# Patient Record
Sex: FEMALE | Race: WHITE | NOT HISPANIC OR LATINO | Employment: OTHER | URBAN - METROPOLITAN AREA
[De-identification: names, ages, dates, MRNs, and addresses within clinical notes are randomized per-mention and may not be internally consistent; named-entity substitution may affect disease eponyms.]

---

## 2018-12-05 NOTE — H&P
I do not have a medical record number for her, but she will be admitted on   12/11/2018.    CHIEF COMPLAINT:  Pelvic pain.    HISTORY OF PRESENT ILLNESS:  Ms. Avitia is a 38-year-old G3, P2-0-1-2 with   pelvic pain after an ablation.  She has dyspareunia and constant pelvic pain and   vaginal bleeding after an ablation.  She is being admitted for definitive   therapy.    PAST MEDICAL HISTORY:  Pertinent for migraines.    PAST SURGICAL HISTORY:  She had spinal fusion.    OBSTETRIC HISTORY:  She had one spontaneous miscarriage and two vaginal   deliveries.    SOCIAL HISTORY:  She is retired  because of migraines.  She has no toxic   habits.    MEDICATIONS:  She is on Migravent, Metamucil, ____, Topamax, Mobic, Zanaflex and   a baby aspirin.    ALLERGIES:  SHE IS ALLERGIC TO COMPAZINE.    REVIEW OF SYSTEMS:  She has a painful uterus.  She does not have UTI symptoms.    She has no chest pain, no shortness of breath.  She does have a history of an   abnormal Pap in the past, but her last Pap smear was normal in November and HPV   negative.    PHYSICAL EXAMINATION:  VITAL SIGNS:  Her blood pressure is 124/80, her weight is 152 pounds and she is   5 feet 10 inches.  Her BMI is 22.  GENERAL:  No distress, alert and oriented.  HEART:  Regular rate and rhythm without murmur.  CHEST:  Clear to auscultation bilaterally.  ABDOMEN:  Soft and nontender.  She does have a vertical scar on her abdomen   below the umbilicus.  PELVIC:  Her uterus is normal shape and approximately 6-week size, anteverted   and moderately tender.  There are no adnexal masses.    LABORATORY DATA:  Her Pap smear is normal.  HPV is negative.  An ultrasound   shows the uterus to be 8.4 x 4.2 x 5 cm, anteverted with no endometrial lesions,   no fibroids and no adnexal cysts.    IMPRESSION:  Dysmenorrhea, dyspareunia and history of ablation.    PLAN:  Laparoscopic assisted vaginal hysterectomy, bilateral salpingectomy and   cystoscopy.  The risks,  benefits and alternatives were explained to the patient.      LGC/IN  dd: 12/04/2018 13:03:57 (CST)  td: 12/04/2018 18:06:38 (CST)  Doc ID   #[id number]  Job ID #[id number]    CC:

## 2018-12-10 ENCOUNTER — HOSPITAL ENCOUNTER (OUTPATIENT)
Dept: PREADMISSION TESTING | Facility: HOSPITAL | Age: 38
Discharge: HOME | End: 2018-12-10
Attending: SPECIALIST
Payer: OTHER GOVERNMENT

## 2018-12-10 ENCOUNTER — CLINICAL SUPPORT (OUTPATIENT)
Dept: LAB | Facility: HOSPITAL | Age: 38
End: 2018-12-10
Attending: SPECIALIST
Payer: OTHER GOVERNMENT

## 2018-12-10 ENCOUNTER — ANESTHESIA EVENT (OUTPATIENT)
Dept: SURGERY | Facility: HOSPITAL | Age: 38
End: 2018-12-10
Payer: OTHER GOVERNMENT

## 2018-12-10 VITALS
OXYGEN SATURATION: 100 % | RESPIRATION RATE: 18 BRPM | WEIGHT: 150.81 LBS | SYSTOLIC BLOOD PRESSURE: 137 MMHG | BODY MASS INDEX: 21.59 KG/M2 | HEIGHT: 70 IN | HEART RATE: 63 BPM | TEMPERATURE: 98 F | DIASTOLIC BLOOD PRESSURE: 79 MMHG

## 2018-12-10 DIAGNOSIS — Z01.818 PREOP EXAMINATION: ICD-10-CM

## 2018-12-10 PROCEDURE — 93010 ELECTROCARDIOGRAM REPORT: CPT | Mod: ,,, | Performed by: INTERNAL MEDICINE

## 2018-12-10 PROCEDURE — 93010 EKG 12-LEAD: ICD-10-PCS | Mod: ,,, | Performed by: INTERNAL MEDICINE

## 2018-12-10 PROCEDURE — 93005 ELECTROCARDIOGRAM TRACING: CPT

## 2018-12-10 RX ORDER — LAMOTRIGINE 100 MG/1
300 TABLET ORAL DAILY
COMMUNITY

## 2018-12-10 RX ORDER — TIZANIDINE 2 MG/1
2 TABLET ORAL NIGHTLY PRN
COMMUNITY
End: 2020-02-06 | Stop reason: CLARIF

## 2018-12-10 RX ORDER — LIDOCAINE HYDROCHLORIDE 10 MG/ML
1 INJECTION, SOLUTION EPIDURAL; INFILTRATION; INTRACAUDAL; PERINEURAL ONCE
Status: CANCELLED | OUTPATIENT
Start: 2018-12-10 | End: 2018-12-10

## 2018-12-10 RX ORDER — BROMPHENIRAMINE MALEATE, DEXTROMETHORPHAN HBR, PHENYLEPHRINE HCL, DIPHENHYDRAMINE HCL, PHENYLEPHRINE HCL 0.52G
0.52 KIT ORAL DAILY
COMMUNITY

## 2018-12-10 RX ORDER — MELOXICAM 7.5 MG/1
15 TABLET ORAL DAILY
COMMUNITY

## 2018-12-10 RX ORDER — TOPIRAMATE 50 MG/1
50 TABLET, FILM COATED ORAL 2 TIMES DAILY
COMMUNITY
End: 2020-02-06 | Stop reason: CLARIF

## 2018-12-10 RX ORDER — DIHYDROERGOTAMINE MESYLATE 4 MG/ML
1 SPRAY NASAL
COMMUNITY

## 2018-12-10 RX ORDER — SODIUM CHLORIDE, SODIUM LACTATE, POTASSIUM CHLORIDE, CALCIUM CHLORIDE 600; 310; 30; 20 MG/100ML; MG/100ML; MG/100ML; MG/100ML
INJECTION, SOLUTION INTRAVENOUS CONTINUOUS
Status: CANCELLED | OUTPATIENT
Start: 2018-12-10

## 2018-12-10 RX ORDER — ASPIRIN 81 MG/1
81 TABLET ORAL DAILY
COMMUNITY

## 2018-12-10 RX ORDER — CEFAZOLIN SODIUM 2 G/50ML
2 SOLUTION INTRAVENOUS
Status: CANCELLED | OUTPATIENT
Start: 2018-12-11

## 2018-12-10 NOTE — DISCHARGE INSTRUCTIONS
Your surgery is scheduled for 12/11/18.    Please report to Outpatient Surgery Intake Office on the 2nd FLOOR at 0845 a.m.          INSTRUCTIONS IMPORTANT!!!  ¨ Do not eat or drink after 12 midnight-including water. OK to brush teeth, no   gum, candy or mints!    ¨ Take only these medicines with a small swallow of water-morning of surgery.        ____  Proceed to [facility] on 12/10/18 for additional blood test.        ____  Do not wear makeup, including mascara.  ____  No powder, lotions or creams to surgical area.  ____  Please remove all jewelry, including piercings and leave at home.  ____  No money or valuables needed. Please leave at home.  ____  Please bring any documents given by your doctor.  ____  If going home the same day, arrange for a ride home. You will not be able to             drive if Anesthesia was used.  ____  Children under 18 years require a parent / guardian present the entire time             they are in surgery / recovery.  ____  Wear loose fitting clothing. Allow for dressings, bandages.  ____  Stop Aspirin, Ibuprofen, Motrin and Aleve at least 3-5 days before surgery, unless otherwise instructed by your doctor, or the nurse.   You MAY use Tylenol/acetaminophen until day of surgery.  ____  Wash the surgical area with Hibiclens the night before surgery, and again the             morning of surgery.  Be sure to rinse hibiclens off completely (if instructed by   nurse).  ____  If you take diabetic medication, do not take am of surgery unless instructed by Doctor.  ____  Call MD for temperature above 101 degrees or any other signs of infection such as Urinary (bladder) infection, Upper respiratory infection, skin boils, etc.  ____ Stop taking any Fish Oil supplement or any Vitamins that contain Vitamin E at least 5 days prior to surgery.  ____ Do Not wear your contact lenses the day of your procedure.  You may wear your glasses.      ____Do not shave for 3 days prior to  surgery.  ____ Practice Good hand washing before, during, and after procedure.      I have read or had read and explained to me, and understand the above information.  Additional comments or instructions:  For additional questions call [phone]      Pre-Op Bathing Instructions    Before surgery, you can play an important role in your own health.    Because skin is not sterile, we need to be sure that your skin is as free of germs as possible. By following the instructions below, you can reduce the number of germs on your skin before surgery.    IMPORTANT: You will need to shower with a special soap called Hibiclens*, available at any pharmacy.  If you are allergic to Chlorhexidine (the antiseptic in Hibiclens), use an antibacterial soap such as Dial Soap for your preoperative shower.  You will shower with Hibiclens both the night before your surgery and the morning of your surgery.  Do not use Hibiclens on the head, face or genitals to avoid injury to those areas.    STEP #1: THE NIGHT BEFORE YOUR SURGERY     1. Do not shave the area of your body where your surgery will be performed.  2. Shower and wash your hair and body as usual with your normal soap and shampoo.  3. Rinse your hair and body thoroughly after you shower to remove all soap residue.  4. With your hand, apply one packet of Hibiclens soap to the surgical site.   5. Wash the site gently for five (5) minutes. Do not scrub your skin too hard.   6. Do not wash with your regular soap after Hibiclens is used.  7. Rinse your body thoroughly.  8. Pat yourself dry with a clean, soft towel.  9. Do not use lotion, cream, or powder.  10. Wear clean clothes.    STEP #2: THE MORNING OF YOUR SURGERY     1. Repeat Step #1.    * Not to be used by people allergic to Chlorhexidine.    Laparoscopic Hysterectomy: Your Experience  Talk to your healthcare provider about how to get ready for your surgery. Your healthcare providers will talk with you about what to expect as  surgery draws near. Keep in mind that your experience may differ from that of other women you know.  Before the day of surgery  Your instructions may include the following:  · Stop taking certain medicines (including aspirin) for as many days before surgery as directed.  · If you smoke, stop as long as possible before surgery.  · Do not eat or drink anything after midnight the night before surgery. This includes chewing gum and mints.  · Arrange ahead of time for a ride home from the hospital or surgery center.  · If it is prescribed, take medicine to clean out your bowels the day before surgery. Your healthcare provider can give you more details about this.  On the day of surgery  Youll change into a gown. Youll then be prepped for your procedure:  · The anesthesiologist or nurse anesthetist will discuss anesthesia with you and answer any questions you have.  · Some pubic hair may be shaved.  · An IV (intravenous) line will be put into your arm or hand. This line supplies you with medicines and fluids before, during, and after surgery.  · You may be given medicine that helps you relax. You will then be given general anesthesia to make you sleep and keep you free from pain during surgery.  Risks and complications of laparoscopic hysterectomy  Once you understand these risks, you will be asked to sign a consent form. Risks and possible complications include:  · Side effects from anesthesia  · Infection  · Bleeding, with a possible need for a transfusion  · Blood clots  · Damage to the bladder, bowel, ureters, or nearby nerves or blood vessels  · Formation of scar tissue that may cause pain or bowel obstruction in the future (more common with abdominal approach)  · Need for a second surgery   Date Last Reviewed: 3/1/2017  © 8329-6063 [employer]. [address] [place] [address], [place] [address]. All rights reserved. This information is not intended as a substitute for professional medical care. Always follow  your healthcare professional's instructions.    Discharge Instructions for Laparoscopic Hysterectomy  You had a procedure called laparoscopic hysterectomy. A surgeon removed your uterus using instruments inserted through small incisions in your abdomen. These incisions may be tender or sore. You may also have pain in your upper back or shoulders. This is from the gas used to enlarge your abdomen to allow your doctor to see inside your pelvis and perform the procedure. This pain usually goes away in a day or two. It usually takes from 1 to 4 weeks to recover from laparoscopic hysterectomy. Remember, though, that recovery time varies from woman to woman. Here's what you can do to speed your recovery following surgery.  Home care   · Continue the coughing and deep breathing exercises that you learned in the hospital.  · Take your medications exactly as directed by your doctor.  · Avoid constipation.  ¨ Eat fruits, vegetables, and whole grains.  ¨ Drink 6 to 8 glasses of water a day, unless told to do otherwise.  ¨ Use a laxative or a mild stool softener if your doctor says it's OK.  · Shower as usual. Wash your incisions with mild soap and water. Pat dry.  · Don't use oils, powders, or lotions on your incisions.  · Don't put anything in your vagina until your doctor says it's safe to do so. Don't use tampons or douches. Don't have sex.  · If you had both ovaries removed, report hot flashes, mood swings, and irritability to your doctor. There may be medications that can help you.  Activity  · Ask your doctor when you can start driving again. It's usually okay to drive as soon as you are free of pain and able to move comfortably from side to side. Don't drive while you are still taking opioid pain medications.  · Ask others to help with chores and errands while you recover.  · Dont lift anything heavier than 10 pounds for 6 weeks.  · Dont vacuum or do other strenuous activities until the doctor says it's OK.  · Walk as  often as you feel able.  · Don't drive for a few days after the surgery. You may drive as soon as you are able to move comfortably from side to side and when you are no longer taking narcotics.  · Climb stairs slowly and pause after every few steps.  Follow-up care  Make a follow-up appointment as directed by our staff.     When to call your doctor  Call your doctor right away if you have any of the following:  · Fever above 100.4°F (38°C) or chills  · Bright red vaginal bleeding or vaginal bleeding that soaks more than one sanitary pad per hour  · A foul smelling discharge from the vagina  · Trouble urinating or burning when you urinate  · Severe pain or bloating in your abdomen  · Redness, swelling, or drainage at your incision sites  · Shortness of breath or chest pain  · Nausea and vomiting   Date Last Reviewed: 5/19/2015  © 1254-4093 [employer]. [address] [place] [address]. All rights reserved. This information is not intended as a substitute for professional medical care. Always follow your healthcare professional's instructions.    Anesthesia: General Anesthesia     You are watched continuously during your procedure by your anesthesia provider.     Youre due to have surgery. During surgery, youll be given medicine called anesthesia or anesthetic. This will keep you comfortable and pain-free. Your anesthesia provider will use general anesthesia.  What is general anesthesia?  General anesthesia puts you into a state like deep sleep. It goes into the bloodstream (IV anesthetics), into the lungs (gas anesthetics), or both. You feel nothing during the procedure. You will not remember it. During the procedure, the anesthesia provider monitors you continuously. He or she checks your heart rate and rhythm, blood pressure, breathing, and blood oxygen.  · IV anesthetics. IV anesthetics are given through an IV line in your arm. Theyre often given first. This is so you are asleep before a  gas anesthetic is started. Some kinds of IV anesthetics relieve pain. Others relax you. Your doctor will decide which kind is best in your case.  · Gas anesthetics. Gas anesthetics are breathed into the lungs. They are often used to keep you asleep. They can be given through a facemask or a tube placed in your larynx or trachea (breathing tube).  ¨ If you have a facemask, your anesthesia provider will most likely place it over your nose and mouth while youre still awake. Youll breathe oxygen through the mask as your IV anesthetic is started. Gas anesthetic may be added through the mask.  ¨ If you have a tube in the larynx or trachea, it will be inserted into your throat after youre asleep.  Anesthesia tools and medicines  You will likely have:  · IV anesthetics. These are put into an IV line into your bloodstream.  · Gas anesthetics. You breathe these anesthetics into your lungs, where they pass into your bloodstream.  · Pulse oximeter. This is a small clip that is attached to the end of your finger. This measures your blood oxygen level.  · Electrocardiography leads (electrodes). These are small sticky pads that are placed on your chest. They record your heart rate and rhythm.  · Blood pressure cuff. This reads your blood pressure.  Risks and possible complications  General anesthesia has some risks. These include:  · Breathing problems  · Nausea and vomiting  · Sore throat or hoarseness (usually temporary)  · Allergic reaction to the anesthetic  · Irregular heartbeat (rare)  · Cardiac arrest (rare)   Anesthesia safety  · Follow all instructions you are given for how long not to eat or drink before your procedure.  · Be sure your doctor knows what medicines and drugs you take. This includes over-the-counter medicines, herbs, supplements, alcohol or other drugs. You will be asked when those were last taken.  · Have an adult family member or friend drive you home after the procedure.  · For the first 24 hours  after your surgery:  ¨ Do not drive or use heavy equipment.  ¨ Do not make important decisions or sign legal documents. If important decisions or signing legal documents is necessary during the first 24 hours after surgery, have a trusted family member or spouse act on your behalf.  ¨ Avoid alcohol.  ¨ Have a responsible adult stay with you. He or she can watch for problems and help keep you safe.  Date Last Reviewed: 12/1/2016  © 0762-7442 [employer]. [address] [place] [address]. All rights reserved. This information is not intended as a substitute for professional medical care. Always follow your healthcare professional's instructions.

## 2018-12-10 NOTE — ANESTHESIA PREPROCEDURE EVALUATION
12/10/2018  Nan Avitia is a 38 y.o., female scheduled for vaginal, laparoscopic assisted hysterectomy on 12/11/18.    Past Medical History:   Diagnosis Date    Back pain     Migraine      Past Surgical History:   Procedure Laterality Date    knee scope      LUMBAR FUSION  02/2016    SHOULDER ARTHROSCOPY  2003    uterine ablation  2016     Anesthesia Evaluation    I have reviewed the Patient Summary Reports.    I have reviewed the Nursing Notes.   I have reviewed the Medications.     Review of Systems  Anesthesia Hx:  No problems with previous Anesthesia    Social:  Non-Smoker, Social Alcohol Use    Hematology/Oncology:  Hematology Normal        Cardiovascular:   Denies Dysrhythmias.   Denies Angina. PFO   Pulmonary:  Pulmonary Normal    Renal/:  Renal/ Normal     Hepatic/GI:  Hepatic/GI Normal  Denies GERD. Denies Liver Disease.    Neurological:   Denies CVA. Headaches Denies Seizures.    Endocrine:  Endocrine Normal        Physical Exam  General:  Well nourished    Airway/Jaw/Neck:  Airway Findings: Mouth Opening: Normal Tongue: Normal  General Airway Assessment: Adult  Mallampati: I      Dental:  Dental Findings: In tact   Chest/Lungs:  Chest/Lungs Findings: Clear to auscultation, Normal Respiratory Rate     Heart/Vascular:  Heart Findings: Rate: Normal  Rhythm: Regular Rhythm  Sounds: Normal  Heart murmur: negative       Mental Status:  Mental Status Findings:  Cooperative, Alert and Oriented       Echo 11/12/18:  Normal left ventricular cavity size.  Normal left ventricular wall thickness.  Normal left ventricular systolic function.  Left ventricular EF estimated at 55%.  Normal diastolic function.  Normal Echo except PFO with right to left bubbles at rest.      CTA heart 12/4/18:  Normal CTA of coronary arteries.  CAD RADS classification 0.    Anesthesia Plan  Type of  Anesthesia, risks & benefits discussed:  Anesthesia Type:  general  Patient's Preference:   Intra-op Monitoring Plan:   Intra-op Monitoring Plan Comments:   Post Op Pain Control Plan: multimodal analgesia  Post Op Pain Control Plan Comments:   Induction:   IV  Beta Blocker:  Patient is not currently on a Beta-Blocker (No further documentation required).       Informed Consent: Patient understands risks and agrees with Anesthesia plan.  Questions answered.   ASA Score: 2     Day of Surgery Review of History & Physical:        Anesthesia Plan Notes: Anesthesia consent to be signed prior to procedure 12/11/18.          Ready For Surgery From Anesthesia Perspective.

## 2018-12-10 NOTE — PLAN OF CARE
Post PAT interview with me, patient stated she had a PFO to Olive NP, all information obtained from her [facility] cardiologist Olive Pineda reviewed info and sent request for possible clearance she also notified anesthesia, patient coming in as planned

## 2018-12-11 ENCOUNTER — ANESTHESIA (OUTPATIENT)
Dept: SURGERY | Facility: HOSPITAL | Age: 38
End: 2018-12-11
Payer: OTHER GOVERNMENT

## 2018-12-11 ENCOUNTER — HOSPITAL ENCOUNTER (OUTPATIENT)
Facility: HOSPITAL | Age: 38
Discharge: HOME | End: 2018-12-12
Attending: SPECIALIST | Admitting: SPECIALIST
Payer: OTHER GOVERNMENT

## 2018-12-11 DIAGNOSIS — Z01.818 PREOP EXAMINATION: ICD-10-CM

## 2018-12-11 LAB — POCT GLUCOSE: 79 MG/DL (ref 70–110)

## 2018-12-11 PROCEDURE — 37000008 HC ANESTHESIA 1ST 15 MINUTES: Performed by: SPECIALIST

## 2018-12-11 PROCEDURE — 71000033 HC RECOVERY, INTIAL HOUR: Performed by: SPECIALIST

## 2018-12-11 PROCEDURE — 88307 TISSUE EXAM BY PATHOLOGIST: CPT

## 2018-12-11 PROCEDURE — 63600175 PHARM REV CODE 636 W HCPCS: Performed by: SPECIALIST

## 2018-12-11 PROCEDURE — 37000009 HC ANESTHESIA EA ADD 15 MINS: Performed by: SPECIALIST

## 2018-12-11 PROCEDURE — 27201423 OPTIME MED/SURG SUP & DEVICES STERILE SUPPLY: Performed by: SPECIALIST

## 2018-12-11 PROCEDURE — 63600175 PHARM REV CODE 636 W HCPCS: Performed by: NURSE ANESTHETIST, CERTIFIED REGISTERED

## 2018-12-11 PROCEDURE — 25000003 PHARM REV CODE 250: Performed by: NURSE PRACTITIONER

## 2018-12-11 PROCEDURE — 36000711: Performed by: SPECIALIST

## 2018-12-11 PROCEDURE — 25000003 PHARM REV CODE 250: Performed by: SPECIALIST

## 2018-12-11 PROCEDURE — C2628 CATHETER, OCCLUSION: HCPCS | Performed by: SPECIALIST

## 2018-12-11 PROCEDURE — 71000039 HC RECOVERY, EACH ADD'L HOUR: Performed by: SPECIALIST

## 2018-12-11 PROCEDURE — 63600175 PHARM REV CODE 636 W HCPCS: Performed by: ANESTHESIOLOGY

## 2018-12-11 PROCEDURE — 00944 ANES VAG PX VAG HYSTERECTOMY: CPT | Performed by: SPECIALIST

## 2018-12-11 PROCEDURE — 25000003 PHARM REV CODE 250: Performed by: NURSE ANESTHETIST, CERTIFIED REGISTERED

## 2018-12-11 PROCEDURE — 36000710: Performed by: SPECIALIST

## 2018-12-11 PROCEDURE — 88307 TISSUE EXAM BY PATHOLOGIST: CPT | Mod: 26,,, | Performed by: PATHOLOGY

## 2018-12-11 RX ORDER — LIDOCAINE HCL/PF 100 MG/5ML
SYRINGE (ML) INTRAVENOUS
Status: DISCONTINUED | OUTPATIENT
Start: 2018-12-11 | End: 2018-12-11

## 2018-12-11 RX ORDER — LIDOCAINE HYDROCHLORIDE 10 MG/ML
1 INJECTION, SOLUTION EPIDURAL; INFILTRATION; INTRACAUDAL; PERINEURAL ONCE
Status: DISCONTINUED | OUTPATIENT
Start: 2018-12-11 | End: 2018-12-11 | Stop reason: HOSPADM

## 2018-12-11 RX ORDER — HYDROMORPHONE HCL 2 MG/ML
VIAL (ML) INJECTION
Status: DISCONTINUED | OUTPATIENT
Start: 2018-12-11 | End: 2018-12-11

## 2018-12-11 RX ORDER — ONDANSETRON 8 MG/1
8 TABLET, ORALLY DISINTEGRATING ORAL EVERY 8 HOURS PRN
Status: DISCONTINUED | OUTPATIENT
Start: 2018-12-11 | End: 2018-12-12 | Stop reason: HOSPADM

## 2018-12-11 RX ORDER — NEOSTIGMINE METHYLSULFATE 1 MG/ML
INJECTION, SOLUTION INTRAVENOUS
Status: DISCONTINUED | OUTPATIENT
Start: 2018-12-11 | End: 2018-12-11

## 2018-12-11 RX ORDER — DEXAMETHASONE SODIUM PHOSPHATE 4 MG/ML
INJECTION, SOLUTION INTRA-ARTICULAR; INTRALESIONAL; INTRAMUSCULAR; INTRAVENOUS; SOFT TISSUE
Status: DISCONTINUED | OUTPATIENT
Start: 2018-12-11 | End: 2018-12-11

## 2018-12-11 RX ORDER — SODIUM CHLORIDE 0.9 % (FLUSH) 0.9 %
3 SYRINGE (ML) INJECTION
Status: DISCONTINUED | OUTPATIENT
Start: 2018-12-11 | End: 2018-12-12 | Stop reason: HOSPADM

## 2018-12-11 RX ORDER — DIPHENHYDRAMINE HCL 25 MG
25 CAPSULE ORAL ONCE
Status: COMPLETED | OUTPATIENT
Start: 2018-12-11 | End: 2018-12-11

## 2018-12-11 RX ORDER — LAMOTRIGINE 100 MG/1
300 TABLET ORAL DAILY
Status: DISCONTINUED | OUTPATIENT
Start: 2018-12-12 | End: 2018-12-12 | Stop reason: HOSPADM

## 2018-12-11 RX ORDER — SODIUM CHLORIDE, SODIUM LACTATE, POTASSIUM CHLORIDE, CALCIUM CHLORIDE 600; 310; 30; 20 MG/100ML; MG/100ML; MG/100ML; MG/100ML
INJECTION, SOLUTION INTRAVENOUS CONTINUOUS
Status: DISCONTINUED | OUTPATIENT
Start: 2018-12-11 | End: 2018-12-11

## 2018-12-11 RX ORDER — DIPHENHYDRAMINE HCL 25 MG
25 CAPSULE ORAL EVERY 4 HOURS PRN
Status: DISCONTINUED | OUTPATIENT
Start: 2018-12-11 | End: 2018-12-12 | Stop reason: HOSPADM

## 2018-12-11 RX ORDER — FENTANYL CITRATE 50 UG/ML
INJECTION, SOLUTION INTRAMUSCULAR; INTRAVENOUS
Status: DISCONTINUED | OUTPATIENT
Start: 2018-12-11 | End: 2018-12-11

## 2018-12-11 RX ORDER — ONDANSETRON HYDROCHLORIDE 2 MG/ML
INJECTION, SOLUTION INTRAMUSCULAR; INTRAVENOUS
Status: DISCONTINUED | OUTPATIENT
Start: 2018-12-11 | End: 2018-12-11

## 2018-12-11 RX ORDER — HYDROMORPHONE HYDROCHLORIDE 2 MG/ML
0.5 INJECTION, SOLUTION INTRAMUSCULAR; INTRAVENOUS; SUBCUTANEOUS EVERY 5 MIN PRN
Status: DISCONTINUED | OUTPATIENT
Start: 2018-12-11 | End: 2018-12-11 | Stop reason: HOSPADM

## 2018-12-11 RX ORDER — HYDROMORPHONE HYDROCHLORIDE 1 MG/ML
1 INJECTION, SOLUTION INTRAMUSCULAR; INTRAVENOUS; SUBCUTANEOUS EVERY 4 HOURS PRN
Status: DISCONTINUED | OUTPATIENT
Start: 2018-12-11 | End: 2018-12-12 | Stop reason: HOSPADM

## 2018-12-11 RX ORDER — GLYCOPYRROLATE 0.2 MG/ML
INJECTION INTRAMUSCULAR; INTRAVENOUS
Status: DISCONTINUED | OUTPATIENT
Start: 2018-12-11 | End: 2018-12-11

## 2018-12-11 RX ORDER — ACETAMINOPHEN 10 MG/ML
INJECTION, SOLUTION INTRAVENOUS
Status: DISCONTINUED | OUTPATIENT
Start: 2018-12-11 | End: 2018-12-11

## 2018-12-11 RX ORDER — ROCURONIUM BROMIDE 10 MG/ML
INJECTION, SOLUTION INTRAVENOUS
Status: DISCONTINUED | OUTPATIENT
Start: 2018-12-11 | End: 2018-12-11

## 2018-12-11 RX ORDER — SIMETHICONE 80 MG
80 TABLET,CHEWABLE ORAL EVERY 4 HOURS PRN
Status: DISCONTINUED | OUTPATIENT
Start: 2018-12-11 | End: 2018-12-12 | Stop reason: HOSPADM

## 2018-12-11 RX ORDER — OXYCODONE AND ACETAMINOPHEN 5; 325 MG/1; MG/1
1 TABLET ORAL EVERY 4 HOURS PRN
Status: DISCONTINUED | OUTPATIENT
Start: 2018-12-11 | End: 2018-12-12 | Stop reason: HOSPADM

## 2018-12-11 RX ORDER — ACETAMINOPHEN 325 MG/1
650 TABLET ORAL EVERY 4 HOURS PRN
Status: DISCONTINUED | OUTPATIENT
Start: 2018-12-11 | End: 2018-12-12 | Stop reason: HOSPADM

## 2018-12-11 RX ORDER — MIDAZOLAM HYDROCHLORIDE 1 MG/ML
INJECTION INTRAMUSCULAR; INTRAVENOUS
Status: DISCONTINUED | OUTPATIENT
Start: 2018-12-11 | End: 2018-12-11

## 2018-12-11 RX ORDER — PROPOFOL 10 MG/ML
VIAL (ML) INTRAVENOUS
Status: DISCONTINUED | OUTPATIENT
Start: 2018-12-11 | End: 2018-12-11

## 2018-12-11 RX ORDER — CEFAZOLIN SODIUM 1 G/50ML
2 SOLUTION INTRAVENOUS
Status: DISCONTINUED | OUTPATIENT
Start: 2018-12-11 | End: 2018-12-11 | Stop reason: HOSPADM

## 2018-12-11 RX ORDER — KETOROLAC TROMETHAMINE 30 MG/ML
INJECTION, SOLUTION INTRAMUSCULAR; INTRAVENOUS
Status: DISCONTINUED | OUTPATIENT
Start: 2018-12-11 | End: 2018-12-11

## 2018-12-11 RX ORDER — TOPIRAMATE 25 MG/1
50 TABLET ORAL 2 TIMES DAILY
Status: DISCONTINUED | OUTPATIENT
Start: 2018-12-11 | End: 2018-12-12 | Stop reason: HOSPADM

## 2018-12-11 RX ORDER — OXYCODONE AND ACETAMINOPHEN 10; 325 MG/1; MG/1
1 TABLET ORAL EVERY 4 HOURS PRN
Status: DISCONTINUED | OUTPATIENT
Start: 2018-12-11 | End: 2018-12-12 | Stop reason: HOSPADM

## 2018-12-11 RX ADMIN — FENTANYL CITRATE 150 MCG: 50 INJECTION, SOLUTION INTRAMUSCULAR; INTRAVENOUS at 10:12

## 2018-12-11 RX ADMIN — SIMETHICONE CHEW TAB 80 MG 80 MG: 80 TABLET ORAL at 11:12

## 2018-12-11 RX ADMIN — DEXAMETHASONE SODIUM PHOSPHATE 8 MG: 4 INJECTION, SOLUTION INTRAMUSCULAR; INTRAVENOUS at 11:12

## 2018-12-11 RX ADMIN — OXYCODONE HYDROCHLORIDE AND ACETAMINOPHEN 1 TABLET: 10; 325 TABLET ORAL at 08:12

## 2018-12-11 RX ADMIN — HYDROMORPHONE HYDROCHLORIDE 0.5 MG: 2 INJECTION INTRAMUSCULAR; INTRAVENOUS; SUBCUTANEOUS at 01:12

## 2018-12-11 RX ADMIN — ONDANSETRON 8 MG: 2 INJECTION, SOLUTION INTRAMUSCULAR; INTRAVENOUS at 11:12

## 2018-12-11 RX ADMIN — MIDAZOLAM HYDROCHLORIDE 2 MG: 1 INJECTION, SOLUTION INTRAMUSCULAR; INTRAVENOUS at 10:12

## 2018-12-11 RX ADMIN — NEOSTIGMINE METHYLSULFATE 4 MG: 1 INJECTION INTRAVENOUS at 12:12

## 2018-12-11 RX ADMIN — SODIUM CHLORIDE, SODIUM LACTATE, POTASSIUM CHLORIDE, AND CALCIUM CHLORIDE: .6; .31; .03; .02 INJECTION, SOLUTION INTRAVENOUS at 09:12

## 2018-12-11 RX ADMIN — OXYCODONE HYDROCHLORIDE AND ACETAMINOPHEN 1 TABLET: 10; 325 TABLET ORAL at 03:12

## 2018-12-11 RX ADMIN — GLYCOPYRROLATE 0.6 MG: 0.2 INJECTION, SOLUTION INTRAMUSCULAR; INTRAVENOUS at 12:12

## 2018-12-11 RX ADMIN — ROCURONIUM BROMIDE 10 MG: 10 INJECTION, SOLUTION INTRAVENOUS at 11:12

## 2018-12-11 RX ADMIN — TOPIRAMATE 50 MG: 25 TABLET, FILM COATED ORAL at 09:12

## 2018-12-11 RX ADMIN — HYDROMORPHONE HYDROCHLORIDE 1 MG: 1 INJECTION, SOLUTION INTRAMUSCULAR; INTRAVENOUS; SUBCUTANEOUS at 11:12

## 2018-12-11 RX ADMIN — KETOROLAC TROMETHAMINE 30 MG: 30 INJECTION, SOLUTION INTRAMUSCULAR; INTRAVENOUS at 11:12

## 2018-12-11 RX ADMIN — HYDROMORPHONE HYDROCHLORIDE 1 MG: 1 INJECTION, SOLUTION INTRAMUSCULAR; INTRAVENOUS; SUBCUTANEOUS at 06:12

## 2018-12-11 RX ADMIN — HYDROMORPHONE HYDROCHLORIDE 0.4 MG: 2 INJECTION INTRAMUSCULAR; INTRAVENOUS; SUBCUTANEOUS at 12:12

## 2018-12-11 RX ADMIN — ROCURONIUM BROMIDE 40 MG: 10 INJECTION, SOLUTION INTRAVENOUS at 10:12

## 2018-12-11 RX ADMIN — HYDROMORPHONE HYDROCHLORIDE 0.5 MG: 2 INJECTION INTRAMUSCULAR; INTRAVENOUS; SUBCUTANEOUS at 02:12

## 2018-12-11 RX ADMIN — FENTANYL CITRATE 75 MCG: 50 INJECTION, SOLUTION INTRAMUSCULAR; INTRAVENOUS at 11:12

## 2018-12-11 RX ADMIN — CEFAZOLIN SODIUM 2 G: 1 SOLUTION INTRAVENOUS at 10:12

## 2018-12-11 RX ADMIN — GLYCOPYRROLATE 0.2 MG: 0.2 INJECTION, SOLUTION INTRAMUSCULAR; INTRAVENOUS at 11:12

## 2018-12-11 RX ADMIN — DIPHENHYDRAMINE HYDROCHLORIDE 25 MG: 25 CAPSULE ORAL at 03:12

## 2018-12-11 RX ADMIN — PROPOFOL 170 MG: 10 INJECTION, EMULSION INTRAVENOUS at 10:12

## 2018-12-11 RX ADMIN — FENTANYL CITRATE 25 MCG: 50 INJECTION, SOLUTION INTRAMUSCULAR; INTRAVENOUS at 11:12

## 2018-12-11 RX ADMIN — LIDOCAINE HYDROCHLORIDE 80 MG: 20 INJECTION, SOLUTION INTRAVENOUS at 10:12

## 2018-12-11 RX ADMIN — ACETAMINOPHEN 1000 MG: 10 INJECTION, SOLUTION INTRAVENOUS at 10:12

## 2018-12-11 NOTE — ANESTHESIA POSTPROCEDURE EVALUATION
"Anesthesia Post Evaluation    Patient: Nan Avitia    Procedure(s) Performed: Procedure(s) (LRB):  HYSTERECTOMY, VAGINAL, LAPAROSCOPY-ASSISTED (Bilateral)  CYSTOSCOPY (N/A)    Final Anesthesia Type: general  Patient location during evaluation: PACU  Patient participation: Yes- Able to Participate  Level of consciousness: awake and alert  Post-procedure vital signs: reviewed and stable  Pain management: adequate  Airway patency: patent  PONV status at discharge: No PONV  Anesthetic complications: no      Cardiovascular status: blood pressure returned to baseline  Respiratory status: unassisted  Hydration status: euvolemic  Follow-up not needed.        Visit Vitals  /60   Pulse (!) 58   Temp 36.6 °C (97.9 °F)   Resp 13   Ht 5' 10" (1.778 m)   Wt 68 kg (150 lb)   LMP 12/01/2018   SpO2 100%   Breastfeeding? No   BMI 21.52 kg/m²       Pain/Macie Score: Macie Score: 8 (12/11/2018 12:41 PM)  Modified Macie Score: 18 (12/11/2018 12:41 PM)        "

## 2018-12-11 NOTE — PLAN OF CARE
Pt c/o pain and itching. Medicated, per md order. See MAR. VSS. AAOx3. Tolerating liquids. Cont to monitor.

## 2018-12-11 NOTE — PLAN OF CARE
Received from Or per stretcher/bed. Monitors and O2 applied. VSS See flow sheets. Family updated per text system.

## 2018-12-11 NOTE — TRANSFER OF CARE
"Anesthesia Transfer of Care Note    Patient: Nan Avitia    Procedure(s) Performed: Procedure(s) (LRB):  HYSTERECTOMY, VAGINAL, LAPAROSCOPY-ASSISTED (Bilateral)  CYSTOSCOPY (N/A)    Patient location: PACU    Anesthesia Type: general    Transport from OR: Transported from OR on 6-10 L/min O2 by face mask with adequate spontaneous ventilation    Post assessment: no apparent anesthetic complications    Post vital signs: stable    Level of consciousness: sedated    Nausea/Vomiting: no nausea/vomiting    Complications: none    Transfer of care protocol was followed      Last vitals:   Visit Vitals  /68   Pulse (!) 55   Temp 37.1 °C (98.8 °F) (Skin)   Resp 18   Ht 5' 10" (1.778 m)   Wt 68 kg (150 lb)   LMP 12/01/2018   SpO2 99%   Breastfeeding? No   BMI 21.52 kg/m²     "

## 2018-12-11 NOTE — PLAN OF CARE
VSS. Denies pain or discomfort @ this time. Report called to Angelica Chaves RN, with time allotted for questions.

## 2018-12-11 NOTE — PROGRESS NOTES
Pt arrived to unit from PACU. Admission questions completed by VN. Introduced self as VN for this shift. Educated pt on VTE risk, safety precautions, and VN's role in pt care. Opportunity given for pt's questions. All questions answered.      Patient with breakthrough pain and requesting home migraine medications to be ordered.  Call placed to Dr Ortez with update of above.  Telephone orders taken and reviewed.   JONES Dumont updated of above.

## 2018-12-12 VITALS
OXYGEN SATURATION: 95 % | HEART RATE: 60 BPM | RESPIRATION RATE: 18 BRPM | BODY MASS INDEX: 21.47 KG/M2 | DIASTOLIC BLOOD PRESSURE: 63 MMHG | TEMPERATURE: 99 F | HEIGHT: 70 IN | SYSTOLIC BLOOD PRESSURE: 101 MMHG | WEIGHT: 150 LBS

## 2018-12-12 LAB
BASOPHILS # BLD AUTO: 0.01 K/UL
BASOPHILS NFR BLD: 0.2 %
DIFFERENTIAL METHOD: ABNORMAL
EOSINOPHIL # BLD AUTO: 0 K/UL
EOSINOPHIL NFR BLD: 0.6 %
ERYTHROCYTE [DISTWIDTH] IN BLOOD BY AUTOMATED COUNT: 12.2 %
HCT VFR BLD AUTO: 36.7 %
HGB BLD-MCNC: 12.1 G/DL
LYMPHOCYTES # BLD AUTO: 1.1 K/UL
LYMPHOCYTES NFR BLD: 17.3 %
MCH RBC QN AUTO: 28.7 PG
MCHC RBC AUTO-ENTMCNC: 33 G/DL
MCV RBC AUTO: 87 FL
MONOCYTES # BLD AUTO: 0.7 K/UL
MONOCYTES NFR BLD: 11.7 %
NEUTROPHILS # BLD AUTO: 4.3 K/UL
NEUTROPHILS NFR BLD: 70 %
PLATELET # BLD AUTO: 160 K/UL
PMV BLD AUTO: 9.3 FL
RBC # BLD AUTO: 4.21 M/UL
WBC # BLD AUTO: 6.17 K/UL

## 2018-12-12 PROCEDURE — 85025 COMPLETE CBC W/AUTO DIFF WBC: CPT

## 2018-12-12 PROCEDURE — 25000003 PHARM REV CODE 250: Performed by: SPECIALIST

## 2018-12-12 PROCEDURE — 36415 COLL VENOUS BLD VENIPUNCTURE: CPT

## 2018-12-12 RX ADMIN — OXYCODONE HYDROCHLORIDE AND ACETAMINOPHEN 1 TABLET: 10; 325 TABLET ORAL at 09:12

## 2018-12-12 RX ADMIN — LAMOTRIGINE 300 MG: 100 TABLET ORAL at 08:12

## 2018-12-12 RX ADMIN — TOPIRAMATE 50 MG: 25 TABLET, FILM COATED ORAL at 08:12

## 2018-12-12 RX ADMIN — OXYCODONE HYDROCHLORIDE AND ACETAMINOPHEN 1 TABLET: 10; 325 TABLET ORAL at 05:12

## 2018-12-12 RX ADMIN — OXYCODONE HYDROCHLORIDE AND ACETAMINOPHEN 1 TABLET: 10; 325 TABLET ORAL at 01:12

## 2018-12-12 NOTE — PROGRESS NOTES
Patient arrived from PACU.VSS. Oriented patient to room and VN. Pain controlled with PRN meds, Patient migraine home meds will be delivered to pharmacy.  All questions answered. Safety maintained. Will continue to monitor.

## 2018-12-12 NOTE — PROGRESS NOTES
"VN made round.  Patient states that she has been using the bathroom X2 after her Coronel catheter is removed this am.  Patient denies pain and states, "wanting to rest."  Will continue to monitor.   "

## 2018-12-12 NOTE — OP NOTE
DATE OF PROCEDURE:  2018    PREOPERATIVE DIAGNOSES:  Pelvic pain and dysmenorrhea, post-ablation and   continued vaginal bleeding, post ablation.    POSTOPERATIVE DIAGNOSES:  Pelvic pain and dysmenorrhea, post-ablation and   continued vaginal bleeding, post ablation.    PROCEDURE:  Laparoscopic-assisted vaginal hysterectomy, bilateral salpingectomy,   and cystoscopy.    SURGEON:  Reba Ortez M.D.    ASSISTANT:  Eze Powers M.D.    ANESTHESIA:  General endotracheal.    COMPLICATIONS:  None.    ESTIMATED BLOOD LOSS:  100 mL    DRAINS:  Coronel to gravity.    HISTORY:  Ms. Avitia is a 38-year-old -0-1-2 with pelvic pain,   dysmenorrhea, dyspareunia, and continued vaginal bleeding after an endometrial   ablation in the past.  She was being admitted for definitive therapy.  The   risks, benefits, and alternatives were explained to her prior to the procedure.    PROCEDURE IN DETAIL:  The patient was taken to the Operating Room, placed on the   table in dorsal supine position.  General anesthesia was administered per the   Anesthesia Service without complication.  She was then sterilely prepped and   draped in usual fashion and a Coronel was placed.  Two stay sutures were placed on   the 3 and 9 o'clock positions on the cervix and then the ABELARDO uterine   manipulator was inserted into the uterus.  Gloves and gowns were changed and   then attention was turned to the abdomen.  A 1-cm infraumbilical incision was   made with the scalpel.  The Visiport trocar was inserted under direct   visualization.  The abdomen was insufflated.  The pelvic contents were studied.    The uterus was normal shape and size and severely retroverted.  The tubes and   ovaries appeared to be normal.  Two 5-mm trocars were inserted under direct   visualization, one being in the suprapubic area and one in the left lower   quadrant.  A probe was used to move about the pelvic contents.  The ureters were   visualized bilaterally and  peristalsing.  On the left, the fallopian tube was   removed with the LigaSure device.  The utero-ovarian pedicle was cauterized and   cut and hemostasis was achieved.  The round ligament was taken down with the   LigaSure device and then the bladder flap was created with the LigaSure device   and hemostasis was achieved.  The uterine vessels were cauterized on the left   and cut and hemostasis was achieved.  On the right side, the tube was removed   away from the ovary.  The utero-ovarian pedicle was cauterized with the LigaSure   and cut and hemostasis was achieved.  The round ligament was taken down with   the LigaSure device and hemostasis was achieved.  The bladder flap was then   better created with the LigaSure and hemostasis was confirmed.  The uterine   vessels on the right were cauterized and cut.  Serial bites of the cardinal   ligaments were taken down with the LigaSure and hemostasis was achieved.  There   was a cyst or possible hematoma that was extruding from the left ovary and this   was removed and sent with the specimen.  The monopolar cautery was then used to   cut a circumferential colpotomy around the blue ring of the ABELARDO.  The specimen   was delivered through the vagina.  The attention was then turned to the vagina.    The right angle was oozing slightly, and this was controlled with a   figure-of-eight suture of #1 Vicryl.  The angles of the cuff were visualized and   the cuff was hemostatic.  The cuff was closed with #1 Vicryl in a running   interlocking stitch and hemostasis was assured.  The Coronel was then removed.    Cystoscopy was done.  Both ureters were spewing clear urine and the bladder was   free of any damage.  The cystoscope was then removed.  The Coronel was replaced.    Gloves and gowns were changed and then the abdomen was insufflated again.  All   areas of dissection were hemostatic.  The pelvis was irrigated.  A vial of   Jay Jay was placed.  Hemostasis was assured.  The patient  was then returned to   the dorsal supine position.  The trocars were removed.  A deep suture of 0   Vicryl was placed in the left lower quadrant puncture and the larger umbilical   puncture and then Dermabond was placed.  The incisions were hemostatic.  The   patient was returned to the dorsal supine position.  She was awakened without   incident.  She tolerated the procedure well.  Sponge, lap and needle count were   correct x2 and she was taken to the Recovery Room awake and alert, in stable   condition.      LGC/HN  dd: 12/11/2018 13:40:51 (CST)  td: 12/11/2018 20:03:38 (CST)  Doc ID   #[id number]  Job ID #[id number]    CC: Eze Powers M.D.

## 2018-12-12 NOTE — PLAN OF CARE
Pt discharged to home -   pt's nurse reviewed all d/c meds/info   TN spoke with pt -- accompanied by spouse - will have help at home     independent prior to admit     confirmed f/u apt with GYN MD   12/27/18 at 3pm  - Dr. Ortez   d/c summ to be faxed to pcp - ok per pt.          12/12/18 5039   Discharge Assessment   Assessment Type Discharge Planning Assessment   Confirmed/corrected address and phone number on facesheet? Yes   Assessment information obtained from? Patient   Expected Length of Stay (days) 1   Communicated expected length of stay with patient/caregiver yes   Prior to hospitilization cognitive status: Alert/Oriented   Prior to hospitalization functional status: Independent   Current cognitive status: Alert/Oriented   Current Functional Status: Independent   Lives With spouse   Able to Return to Prior Arrangements yes   Is patient able to care for self after discharge? Yes   Patient's perception of discharge disposition home or selfcare   Readmission Within the Last 30 Days no previous admission in last 30 days   Patient currently being followed by outpatient case management? No   Patient currently receives any other outside agency services? No   Equipment Currently Used at Home none   Do you have any problems affording any of your prescribed medications? No  (Lou MCDANIELS   )   Is the patient taking medications as prescribed? yes   Does the patient have transportation home? Yes   Does the patient receive services at the [facility]? No   Discharge Plan A Home;Home with family

## 2018-12-12 NOTE — PLAN OF CARE
Problem: Adult Inpatient Plan of Care  Goal: Plan of Care Review  Outcome: Ongoing (interventions implemented as appropriate)  Pt AAOx4, no family members at bedside throughout shift. Pt complains of intermittent abd pain but denies n/v/d and SOB. Pt's diet advanced to high fiber d/t to nausea overnight. Coronel catheter intact and draining clear yellow urine. Safety maintained, bed alarm on - will cont to monitor.

## 2018-12-12 NOTE — PROGRESS NOTES
Patient is being discharged home.  Discharge instructions on medications and follow-up visit given to patient. Patient verbalized complete understanding on the the above discharge instructions. Prescription on pain medication given to patient.  Voiced no other concerns.  Instructed to call for wheelchair when patient is ready to leave.

## 2018-12-12 NOTE — PLAN OF CARE
Problem: Adult Inpatient Plan of Care  Goal: Plan of Care Review  Denies discomfort. Wearing tin hose, denies difficulty voiding. Denies dizziness, up ad an.

## 2019-01-02 NOTE — DISCHARGE SUMMARY
DATE OF ADMIT:  12/11/2018    DATE OF DISCHARGE:  12/12/2018.    HOSPITAL COURSE:  Ms. Avitia is a 38-year-old with pelvic pain and continued   bleeding after an ablation.  She was admitted for definitive therapy.  She had   a laparoscopic-assisted vaginal hysterectomy and bilateral salpingectomy and   cystoscopy on the date of admit.  Her postoperative course has been uneventful   except she had some pain postoperatively, but now is tolerating a regular diet,   ambulating without difficulty.  Her incisions are clean, dry and intact.  She   will be discharged in stable condition.    DISCHARGE DISPOSITION:  Discharged to home.    DIAGNOSIS:  Status post LAVH and salpingectomy and cystoscopy.    CONDITION:  Stable.    DIET:  Regular.    ACTIVITY:  Pelvic rest and frequent rest.    MEDICATIONS:  Percocet 5 mg p.o. q. 4 hours p.r.n. pain and Motrin 800 mg p.o.   q. 8 hours p.r.n. pain.    INSTRUCTIONS:  The patient was instructed to follow up in the office in 2 weeks   and to return or call for any problems.      LGC/HN  dd: 01/01/2019 12:19:11 (CST)  td: 01/01/2019 19:41:04 (CST)  Doc ID   #[id number]  Job ID #[id number]    CC:

## 2020-02-06 ENCOUNTER — HOSPITAL ENCOUNTER (OUTPATIENT)
Dept: PREADMISSION TESTING | Facility: OTHER | Age: 40
Discharge: HOME | End: 2020-02-06
Attending: ORTHOPAEDIC SURGERY
Payer: OTHER GOVERNMENT

## 2020-02-06 ENCOUNTER — ANESTHESIA EVENT (OUTPATIENT)
Dept: SURGERY | Facility: OTHER | Age: 40
End: 2020-02-06
Payer: OTHER GOVERNMENT

## 2020-02-06 VITALS
OXYGEN SATURATION: 100 % | SYSTOLIC BLOOD PRESSURE: 114 MMHG | HEART RATE: 66 BPM | RESPIRATION RATE: 16 BRPM | HEIGHT: 70 IN | BODY MASS INDEX: 21.05 KG/M2 | DIASTOLIC BLOOD PRESSURE: 82 MMHG | TEMPERATURE: 98 F | WEIGHT: 147 LBS

## 2020-02-06 RX ORDER — LIDOCAINE HYDROCHLORIDE 10 MG/ML
0.5 INJECTION, SOLUTION EPIDURAL; INFILTRATION; INTRACAUDAL; PERINEURAL ONCE
Status: CANCELLED | OUTPATIENT
Start: 2020-02-06 | End: 2020-02-06

## 2020-02-06 RX ORDER — PREGABALIN 50 MG/1
50 CAPSULE ORAL
Status: CANCELLED | OUTPATIENT
Start: 2020-02-06 | End: 2020-02-06

## 2020-02-06 RX ORDER — CELECOXIB 200 MG/1
400 CAPSULE ORAL
Status: CANCELLED | OUTPATIENT
Start: 2020-02-06 | End: 2020-02-06

## 2020-02-06 RX ORDER — FAMOTIDINE 20 MG/1
20 TABLET, FILM COATED ORAL
Status: CANCELLED | OUTPATIENT
Start: 2020-02-06 | End: 2020-02-06

## 2020-02-06 RX ORDER — ACETAMINOPHEN 500 MG
1000 TABLET ORAL
Status: CANCELLED | OUTPATIENT
Start: 2020-02-06 | End: 2020-02-06

## 2020-02-06 RX ORDER — SODIUM CHLORIDE, SODIUM LACTATE, POTASSIUM CHLORIDE, CALCIUM CHLORIDE 600; 310; 30; 20 MG/100ML; MG/100ML; MG/100ML; MG/100ML
INJECTION, SOLUTION INTRAVENOUS CONTINUOUS
Status: CANCELLED | OUTPATIENT
Start: 2020-02-06

## 2020-02-06 RX ORDER — BACLOFEN 20 MG/1
20 TABLET ORAL 2 TIMES DAILY
COMMUNITY

## 2020-02-06 NOTE — ANESTHESIA PREPROCEDURE EVALUATION
02/06/2020  Nan Avitia is a 39 y.o., female.    Anesthesia Evaluation    I have reviewed the Patient Summary Reports.    I have reviewed the Nursing Notes.   I have reviewed the Medications.     Review of Systems  Anesthesia Hx:  No problems with previous Anesthesia  History of prior surgery of interest to airway management or planning: Previous anesthesia: General 2018 hyst with general anesthesia.  Airway issues documented on chart review include mask, easy, GETA, easy direct laryngoscopy , view on direct laryngoscopy Grade I    Social:  Non-Smoker, No Alcohol Use    Hematology/Oncology:  Hematology Normal   Oncology Normal     EENT/Dental:EENT/Dental Normal   Cardiovascular:   Exercise tolerance: good Dysrhythmias PFO. Has loop recorder. Completely negative cardiac w/u recently. See in care everywhere.   Pulmonary:  Pulmonary Normal    Renal/:  Renal/ Normal     Hepatic/GI:  Hepatic/GI Normal    Musculoskeletal:  Spine Disorders: lumbar Chronic Pain    Neurological:   Headaches    Endocrine:  Endocrine Normal    Dermatological:  Skin Normal    Psych:  Psychiatric Normal           Physical Exam  General:  Well nourished    Airway/Jaw/Neck:  Airway Findings: Mouth Opening: Normal Tongue: Normal  General Airway Assessment: Adult, Good  Mallampati: I  TM Distance: Normal, at least 6 cm  Jaw/Neck Findings:  Neck ROM: Normal ROM      Dental:  Dental Findings: In tact        Mental Status:  Mental Status Findings:  Cooperative, Alert and Oriented         Anesthesia Plan  Type of Anesthesia, risks & benefits discussed:  Anesthesia Type:  general  Patient's Preference:   Intra-op Monitoring Plan: standard ASA monitors  Intra-op Monitoring Plan Comments:   Post Op Pain Control Plan: multimodal analgesia and per primary service following discharge from PACU  Post Op Pain Control Plan Comments:    Induction:   IV  Beta Blocker:         Informed Consent: Patient understands risks and agrees with Anesthesia plan.  Questions answered. Anesthesia consent signed with patient.  ASA Score: 2     Day of Surgery Review of History & Physical:    H&P update referred to the surgeon.     Anesthesia Plan Notes: No labs. Block??? Discussed anesthesia side effects with patient.        Ready For Surgery From Anesthesia Perspective.

## 2020-02-06 NOTE — DISCHARGE INSTRUCTIONS
PRE-ADMIT TESTING -  [phone]    [address]  [place] [facility]          Your surgery has been scheduled at [facility]. We are pleased to have the opportunity to serve you. For Further Information please call [phone].    On the day of surgery please report to the Information Desk on the 1st floor.    · CONTACT YOUR PHYSICIAN'S OFFICE THE DAY PRIOR TO YOUR SURGERY TO OBTAIN YOUR ARRIVAL TIME.     · The evening before surgery do not eat anything after 9 p.m. ( this includes hard candy, chewing gum and mints).  You may only have GATORADE, POWERADE AND WATER  from 9 p.m. until you leave your home.   DO NOT DRINK ANY LIQUIDS ON THE WAY TO THE HOSPITAL.      SPECIAL MEDICATION INSTRUCTIONS: TAKE medications checked off by the Anesthesiologist on your Medication List.    Angiogram Patients: Take medications as instructed by your physician, including aspirin.     Surgery Patients:    If you take ASPIRIN - Your PHYSICIAN/SURGEON will need to inform you IF/OR when you need to stop taking aspirin prior to your surgery.     Do Not take any medications containing IBUPROFEN.  Do Not Wear any make-up or dark nail polish   (especially eye make-up) to surgery. If you come to surgery with makeup on you will be required to remove the makeup or nail polish.    Do not shave your surgical area at least 5 days prior to your surgery. The surgical prep will be performed at the hospital according to Infection Control regulations.    Leave all valuables at home.   Do Not wear any jewelry or watches, including any metal in body piercings. Jewelry must be removed prior to coming to the hospital.  There is a possibility that rings that are unable to be removed may be cut off if they are on the surgical extremity.    Contact Lens must be removed before surgery. Either do not wear the contact lens or bring a case and solution for storage.  Please bring a container for eyeglasses or dentures as required.  Bring  any paperwork your physician has provided, such as consent forms,  history and physicals, doctor's orders, etc.   Bring comfortable clothes that are loose fitting to wear upon discharge. Take into consideration the type of surgery being performed.  Maintain your diet as advised per your physician the day prior to surgery.      Adequate rest the night before surgery is advised.   Park in the Parking lot behind the hospital or in the [facility] across the street from the parking lot. Parking is complimentary.  If you will be discharged the same day as your procedure, please arrange for a responsible adult to drive you home or to accompany you if traveling by taxi.   YOU WILL NOT BE PERMITTED TO DRIVE OR TO LEAVE THE HOSPITAL ALONE AFTER SURGERY.   It is strongly recommended that you arrange for someone to remain with you for the first 24 hrs following your surgery.    The Surgeon will speak to your family/visitor after your surgery regarding the outcome of your surgery and post op care.  The Surgeon may speak to you after your surgery, but there is a possibility you may not remember the details.  Please check with your family members regarding the conversation with the Surgeon.    We strongly recommend whoever is bringing you home be present for discharge instructions.  This will ensure a thorough understanding for your post op home care.    EACH PATIENT IS ALLOWED TWO FAMILY MEMBERS OR VISITORS IN THE ROOM AND IN THE WAITING ROOMS WHILE YOU ARE IN SURGERY. ALL CHILDREN MUST ALWAYS BE ACCOMPANIED BY AN ADULT.    Thank you for your cooperation.  The Staff of [facility].                Bathing Instructions with Hibiclens     Shower the evening before and morning of your procedure with Hibiclens:   Wash your face with water and your regular face wash/soap   Apply Hibiclens directly on your skin or on a wet washcloth and wash gently. When showering: Move away from the shower stream when  applying Hibiclens to avoid rinsing off too soon.   Rinse thoroughly with warm water   Do not dilute Hibiclens         Dry off as usual, do not use any deodorant, powder, body lotions, perfume, after shave or cologne.

## 2020-02-19 ENCOUNTER — HOSPITAL ENCOUNTER (OUTPATIENT)
Facility: OTHER | Age: 40
Discharge: HOME | End: 2020-02-19
Attending: ORTHOPAEDIC SURGERY | Admitting: ORTHOPAEDIC SURGERY
Payer: OTHER GOVERNMENT

## 2020-02-19 ENCOUNTER — ANESTHESIA (OUTPATIENT)
Dept: SURGERY | Facility: OTHER | Age: 40
End: 2020-02-19
Payer: OTHER GOVERNMENT

## 2020-02-19 VITALS
BODY MASS INDEX: 21.05 KG/M2 | DIASTOLIC BLOOD PRESSURE: 68 MMHG | TEMPERATURE: 98 F | SYSTOLIC BLOOD PRESSURE: 118 MMHG | OXYGEN SATURATION: 99 % | WEIGHT: 147 LBS | HEART RATE: 60 BPM | RESPIRATION RATE: 16 BRPM | HEIGHT: 70 IN

## 2020-02-19 DIAGNOSIS — S43.431A SLAP LESION OF RIGHT SHOULDER: ICD-10-CM

## 2020-02-19 PROCEDURE — 37000008 HC ANESTHESIA 1ST 15 MINUTES: Performed by: ORTHOPAEDIC SURGERY

## 2020-02-19 PROCEDURE — 63600175 PHARM REV CODE 636 W HCPCS: Performed by: ANESTHESIOLOGY

## 2020-02-19 PROCEDURE — 01630 ANES OPN/ARTHR PX SHO JT NOS: CPT | Performed by: ORTHOPAEDIC SURGERY

## 2020-02-19 PROCEDURE — 25000003 PHARM REV CODE 250: Performed by: ORTHOPAEDIC SURGERY

## 2020-02-19 PROCEDURE — 36000710: Performed by: ORTHOPAEDIC SURGERY

## 2020-02-19 PROCEDURE — 25000003 PHARM REV CODE 250: Performed by: SPECIALIST

## 2020-02-19 PROCEDURE — 63600175 PHARM REV CODE 636 W HCPCS: Performed by: ORTHOPAEDIC SURGERY

## 2020-02-19 PROCEDURE — C1713 ANCHOR/SCREW BN/BN,TIS/BN: HCPCS | Performed by: ORTHOPAEDIC SURGERY

## 2020-02-19 PROCEDURE — 71000039 HC RECOVERY, EACH ADD'L HOUR: Performed by: ORTHOPAEDIC SURGERY

## 2020-02-19 PROCEDURE — 63600175 PHARM REV CODE 636 W HCPCS: Performed by: SPECIALIST

## 2020-02-19 PROCEDURE — 63600175 PHARM REV CODE 636 W HCPCS: Performed by: NURSE ANESTHETIST, CERTIFIED REGISTERED

## 2020-02-19 PROCEDURE — 27201423 OPTIME MED/SURG SUP & DEVICES STERILE SUPPLY: Performed by: ORTHOPAEDIC SURGERY

## 2020-02-19 PROCEDURE — 25000003 PHARM REV CODE 250: Performed by: NURSE ANESTHETIST, CERTIFIED REGISTERED

## 2020-02-19 PROCEDURE — 63600175 PHARM REV CODE 636 W HCPCS: Performed by: PHYSICIAN ASSISTANT

## 2020-02-19 PROCEDURE — 37000009 HC ANESTHESIA EA ADD 15 MINS: Performed by: ORTHOPAEDIC SURGERY

## 2020-02-19 PROCEDURE — 71000016 HC POSTOP RECOV ADDL HR: Performed by: ORTHOPAEDIC SURGERY

## 2020-02-19 PROCEDURE — 36000711: Performed by: ORTHOPAEDIC SURGERY

## 2020-02-19 PROCEDURE — 71000015 HC POSTOP RECOV 1ST HR: Performed by: ORTHOPAEDIC SURGERY

## 2020-02-19 PROCEDURE — 71000033 HC RECOVERY, INTIAL HOUR: Performed by: ORTHOPAEDIC SURGERY

## 2020-02-19 DEVICE — ANCHOR BIOCOMPOSITE 2.9X15.5MM: Type: IMPLANTABLE DEVICE | Site: SHOULDER | Status: FUNCTIONAL

## 2020-02-19 RX ORDER — PREGABALIN 50 MG/1
50 CAPSULE ORAL
Status: COMPLETED | OUTPATIENT
Start: 2020-02-19 | End: 2020-02-19

## 2020-02-19 RX ORDER — ACETAMINOPHEN 500 MG
1000 TABLET ORAL
Status: COMPLETED | OUTPATIENT
Start: 2020-02-19 | End: 2020-02-19

## 2020-02-19 RX ORDER — CELECOXIB 200 MG/1
400 CAPSULE ORAL
Status: COMPLETED | OUTPATIENT
Start: 2020-02-19 | End: 2020-02-19

## 2020-02-19 RX ORDER — LIDOCAINE HYDROCHLORIDE 10 MG/ML
0.5 INJECTION, SOLUTION EPIDURAL; INFILTRATION; INTRACAUDAL; PERINEURAL ONCE
Status: DISCONTINUED | OUTPATIENT
Start: 2020-02-19 | End: 2020-02-19 | Stop reason: HOSPADM

## 2020-02-19 RX ORDER — LIDOCAINE HYDROCHLORIDE 20 MG/ML
INJECTION INTRAVENOUS
Status: DISCONTINUED | OUTPATIENT
Start: 2020-02-19 | End: 2020-02-19

## 2020-02-19 RX ORDER — LIDOCAINE HYDROCHLORIDE AND EPINEPHRINE 10; 10 MG/ML; UG/ML
INJECTION, SOLUTION INFILTRATION; PERINEURAL
Status: DISCONTINUED | OUTPATIENT
Start: 2020-02-19 | End: 2020-02-19 | Stop reason: HOSPADM

## 2020-02-19 RX ORDER — FAMOTIDINE 20 MG/1
20 TABLET, FILM COATED ORAL
Status: COMPLETED | OUTPATIENT
Start: 2020-02-19 | End: 2020-02-19

## 2020-02-19 RX ORDER — SODIUM CHLORIDE 0.9 % (FLUSH) 0.9 %
3 SYRINGE (ML) INJECTION
Status: DISCONTINUED | OUTPATIENT
Start: 2020-02-19 | End: 2020-02-19 | Stop reason: HOSPADM

## 2020-02-19 RX ORDER — SODIUM CHLORIDE, SODIUM LACTATE, POTASSIUM CHLORIDE, CALCIUM CHLORIDE 600; 310; 30; 20 MG/100ML; MG/100ML; MG/100ML; MG/100ML
INJECTION, SOLUTION INTRAVENOUS CONTINUOUS
Status: DISCONTINUED | OUTPATIENT
Start: 2020-02-19 | End: 2020-02-19 | Stop reason: HOSPADM

## 2020-02-19 RX ORDER — PROPOFOL 10 MG/ML
VIAL (ML) INTRAVENOUS
Status: DISCONTINUED | OUTPATIENT
Start: 2020-02-19 | End: 2020-02-19

## 2020-02-19 RX ORDER — ONDANSETRON 2 MG/ML
4 INJECTION INTRAMUSCULAR; INTRAVENOUS DAILY PRN
Status: DISCONTINUED | OUTPATIENT
Start: 2020-02-19 | End: 2020-02-19 | Stop reason: HOSPADM

## 2020-02-19 RX ORDER — EPINEPHRINE 1 MG/ML
INJECTION, SOLUTION INTRACARDIAC; INTRAMUSCULAR; INTRAVENOUS; SUBCUTANEOUS
Status: DISCONTINUED | OUTPATIENT
Start: 2020-02-19 | End: 2020-02-19 | Stop reason: HOSPADM

## 2020-02-19 RX ORDER — KETOROLAC TROMETHAMINE 30 MG/ML
INJECTION, SOLUTION INTRAMUSCULAR; INTRAVENOUS
Status: DISCONTINUED | OUTPATIENT
Start: 2020-02-19 | End: 2020-02-19

## 2020-02-19 RX ORDER — ROPIVACAINE HYDROCHLORIDE 5 MG/ML
INJECTION, SOLUTION EPIDURAL; INFILTRATION; PERINEURAL
Status: DISCONTINUED | OUTPATIENT
Start: 2020-02-19 | End: 2020-02-19 | Stop reason: HOSPADM

## 2020-02-19 RX ORDER — FENTANYL CITRATE 50 UG/ML
INJECTION, SOLUTION INTRAMUSCULAR; INTRAVENOUS
Status: DISCONTINUED | OUTPATIENT
Start: 2020-02-19 | End: 2020-02-19

## 2020-02-19 RX ORDER — DIPHENHYDRAMINE HYDROCHLORIDE 50 MG/ML
12.5 INJECTION INTRAMUSCULAR; INTRAVENOUS EVERY 30 MIN PRN
Status: DISCONTINUED | OUTPATIENT
Start: 2020-02-19 | End: 2020-02-19 | Stop reason: HOSPADM

## 2020-02-19 RX ORDER — OXYCODONE HYDROCHLORIDE 5 MG/1
5 TABLET ORAL
Status: DISCONTINUED | OUTPATIENT
Start: 2020-02-19 | End: 2020-02-19 | Stop reason: HOSPADM

## 2020-02-19 RX ORDER — CEFAZOLIN SODIUM 1 G/3ML
2 INJECTION, POWDER, FOR SOLUTION INTRAMUSCULAR; INTRAVENOUS
Status: COMPLETED | OUTPATIENT
Start: 2020-02-19 | End: 2020-02-19

## 2020-02-19 RX ORDER — SODIUM CHLORIDE 0.9 % (FLUSH) 0.9 %
10 SYRINGE (ML) INJECTION
Status: DISCONTINUED | OUTPATIENT
Start: 2020-02-19 | End: 2020-02-19 | Stop reason: HOSPADM

## 2020-02-19 RX ORDER — ONDANSETRON 2 MG/ML
INJECTION INTRAMUSCULAR; INTRAVENOUS
Status: DISCONTINUED | OUTPATIENT
Start: 2020-02-19 | End: 2020-02-19

## 2020-02-19 RX ORDER — HYDROMORPHONE HYDROCHLORIDE 2 MG/ML
0.4 INJECTION, SOLUTION INTRAMUSCULAR; INTRAVENOUS; SUBCUTANEOUS EVERY 5 MIN PRN
Status: DISCONTINUED | OUTPATIENT
Start: 2020-02-19 | End: 2020-02-19 | Stop reason: HOSPADM

## 2020-02-19 RX ORDER — GLYCOPYRROLATE 0.2 MG/ML
INJECTION INTRAMUSCULAR; INTRAVENOUS
Status: DISCONTINUED | OUTPATIENT
Start: 2020-02-19 | End: 2020-02-19

## 2020-02-19 RX ORDER — OXYCODONE AND ACETAMINOPHEN 5; 325 MG/1; MG/1
1 TABLET ORAL
Qty: 50 TABLET | Refills: 0 | Status: SHIPPED | OUTPATIENT
Start: 2020-02-19

## 2020-02-19 RX ORDER — SODIUM CHLORIDE 9 MG/ML
INJECTION, SOLUTION INTRAVENOUS CONTINUOUS
Status: DISCONTINUED | OUTPATIENT
Start: 2020-02-19 | End: 2020-02-19 | Stop reason: HOSPADM

## 2020-02-19 RX ORDER — MEPERIDINE HYDROCHLORIDE 25 MG/ML
12.5 INJECTION INTRAMUSCULAR; INTRAVENOUS; SUBCUTANEOUS ONCE AS NEEDED
Status: DISCONTINUED | OUTPATIENT
Start: 2020-02-19 | End: 2020-02-19 | Stop reason: HOSPADM

## 2020-02-19 RX ORDER — ROCURONIUM BROMIDE 10 MG/ML
INJECTION, SOLUTION INTRAVENOUS
Status: DISCONTINUED | OUTPATIENT
Start: 2020-02-19 | End: 2020-02-19

## 2020-02-19 RX ORDER — ONDANSETRON 2 MG/ML
4 INJECTION INTRAMUSCULAR; INTRAVENOUS EVERY 12 HOURS PRN
Status: DISCONTINUED | OUTPATIENT
Start: 2020-02-19 | End: 2020-02-19 | Stop reason: HOSPADM

## 2020-02-19 RX ORDER — KETAMINE HCL IN 0.9 % NACL 50 MG/5 ML
SYRINGE (ML) INTRAVENOUS
Status: DISCONTINUED | OUTPATIENT
Start: 2020-02-19 | End: 2020-02-19

## 2020-02-19 RX ORDER — NEOSTIGMINE METHYLSULFATE 1 MG/ML
INJECTION, SOLUTION INTRAVENOUS
Status: DISCONTINUED | OUTPATIENT
Start: 2020-02-19 | End: 2020-02-19

## 2020-02-19 RX ADMIN — HYDROMORPHONE HYDROCHLORIDE 0.4 MG: 2 INJECTION, SOLUTION INTRAMUSCULAR; INTRAVENOUS; SUBCUTANEOUS at 10:02

## 2020-02-19 RX ADMIN — PREGABALIN 50 MG: 50 CAPSULE ORAL at 07:02

## 2020-02-19 RX ADMIN — ONDANSETRON 4 MG: 2 INJECTION INTRAMUSCULAR; INTRAVENOUS at 09:02

## 2020-02-19 RX ADMIN — PROPOFOL 200 MG: 10 INJECTION, EMULSION INTRAVENOUS at 08:02

## 2020-02-19 RX ADMIN — HYDROMORPHONE HYDROCHLORIDE 0.4 MG: 2 INJECTION, SOLUTION INTRAMUSCULAR; INTRAVENOUS; SUBCUTANEOUS at 11:02

## 2020-02-19 RX ADMIN — GLYCOPYRROLATE 0.2 MG: 0.2 INJECTION, SOLUTION INTRAMUSCULAR; INTRAVENOUS at 09:02

## 2020-02-19 RX ADMIN — SODIUM CHLORIDE, SODIUM LACTATE, POTASSIUM CHLORIDE, AND CALCIUM CHLORIDE: 600; 310; 30; 20 INJECTION, SOLUTION INTRAVENOUS at 10:02

## 2020-02-19 RX ADMIN — ROCURONIUM BROMIDE 50 MG: 10 INJECTION, SOLUTION INTRAVENOUS at 08:02

## 2020-02-19 RX ADMIN — KETOROLAC TROMETHAMINE 30 MG: 30 INJECTION, SOLUTION INTRAMUSCULAR; INTRAVENOUS at 10:02

## 2020-02-19 RX ADMIN — LIDOCAINE HYDROCHLORIDE 50 MG: 20 INJECTION, SOLUTION INTRAVENOUS at 08:02

## 2020-02-19 RX ADMIN — FENTANYL CITRATE 100 MCG: 50 INJECTION, SOLUTION INTRAMUSCULAR; INTRAVENOUS at 08:02

## 2020-02-19 RX ADMIN — ONDANSETRON 4 MG: 2 INJECTION INTRAMUSCULAR; INTRAVENOUS at 11:02

## 2020-02-19 RX ADMIN — PHENYLEPHRINE HYDROCHLORIDE 20 MCG/MIN: 10 INJECTION INTRAVENOUS at 09:02

## 2020-02-19 RX ADMIN — SODIUM CHLORIDE, SODIUM LACTATE, POTASSIUM CHLORIDE, AND CALCIUM CHLORIDE: 600; 310; 30; 20 INJECTION, SOLUTION INTRAVENOUS at 08:02

## 2020-02-19 RX ADMIN — FAMOTIDINE 20 MG: 20 TABLET ORAL at 07:02

## 2020-02-19 RX ADMIN — NEOSTIGMINE METHYLSULFATE 4 MG: 1 INJECTION INTRAVENOUS at 10:02

## 2020-02-19 RX ADMIN — GLYCOPYRROLATE 0.8 MG: 0.2 INJECTION, SOLUTION INTRAMUSCULAR; INTRAVENOUS at 10:02

## 2020-02-19 RX ADMIN — CEFAZOLIN 2 G: 330 INJECTION, POWDER, FOR SOLUTION INTRAMUSCULAR; INTRAVENOUS at 09:02

## 2020-02-19 RX ADMIN — ACETAMINOPHEN 1000 MG: 500 TABLET, FILM COATED ORAL at 07:02

## 2020-02-19 RX ADMIN — CELECOXIB 400 MG: 200 CAPSULE ORAL at 07:02

## 2020-02-19 RX ADMIN — Medication 25 MG: at 09:02

## 2020-02-19 NOTE — OP NOTE
[facility]  Surgery Department  Operative Note    SUMMARY     Date of Procedure: 2/19/2020     Procedure: Procedure(s) (LRB):  ARTHROSCOPY, SHOULDER, WITH SLAP REPAIR (Right)  ARTHROSCOPY, SHOULDER, WITH SUBACROMIAL SPACE DECOMPRESSION (Right)   itraarticular debridement limited low grade cuff tearing    Surgeon(s) and Role:     * Andrea García Jr., MD - Primary    Assisting Surgeon: Patricio Sawant PA-C was an integral part of the procedure including prepping, draping, positioning, assistance intraoperatively with the integral aspects of the procedure and   postoperative wound closure.    Pre-Operative Diagnosis: Superior glenoid labrum lesion of right shoulder, subsequent encounter [S43.431D]  Bursitis    Post-Operative Diagnosis: Post-Op Diagnosis Codes:     * Superior glenoid labrum lesion of right shoulder, subsequent encounter [S43.431D]    Bursitits  Low grade partial thickness articular sided tear  Anesthesia: General    Technical Procedures Used:  The patient was taken up room prepped and draped in usual sterile fashion.  Time-out and preoperative antibiotics of burs .  The joint was insufflated with fluid and the diagnostic arthroscopy portion was of the procedure was began an outside in anterior portal was informed patient found to have an anterior superior labral tear. She also found to have some low-grade partial-thickness articular sided supraspinatus tearing.  I began by debriding the undersurface of the supraspinatus to be certain that it was in a more significant tear of grade in the 10-15% range at then debrided the footprint for the anterior superior labrum.  Next 2 knotless anchors were placed through the anterior portal with cinch type sutures to secure the anterior superior labrum subsequent to that I confirmed with photos and probing that this was stabilized also pulled the biceps into the joint for moves in good condition.  Also examine the posterior superior  labrum which was in good condition. I moved in the subacromial space which she had some bursitis which was debrided she also had a type 2 acromion that was debrided using a chaitanya was subacromial space was opened I evaluated the bursal surface of the cuff which was in good condition. I was not certain if I would do this part of the procedure but since there was some partial thickness articular sided cuff tearing felt it was best to look at the bursal surface to be certain that it was healthy once the bursitis was removed the bursal surface the cuff was in good condition. She tolerated procedure well the left contacting the case blood loss minimal space and a Smart type sling and sterile dressings applied in dictation thanks    De

## 2020-02-19 NOTE — DISCHARGE INSTRUCTIONS
Pete Perkins M.D.  Andrea García M.D.  Denis Villarreal M.D.          [facility] [facility] [address]  [place] [address]  Phone: [phone]  Fax: [phone]         DISCHARGE INSTRUCTIONS for Shoulder Surgery             Call our office ([phone]) immediately if you experience any of the following:      Excessive bleeding or pus like drainage at the incision site       Uncontrollable pain not relieved by pain medication       Excessive swelling or redness at the incision site       Fever above 101.5 degrees not controlled with Tylenol or Motrin       Shortness of Breath       Any foul odor or blistering from the surgery site     FOR EMERGENCIES: If any unusual problems or difficulties occur, call our office at [phone], or [phone] (After hours) or contact 911 at any time for emergencies    1.   Diet: Eat a liquid / bland diet for the first day after surgery. Progress your to your regular diet as tolerated. Constipation may occur with Narcotic usage, contact our office if you are experiencing constipation.    2.   Pain Management: Ice, pain medications and anesthesia injections are used to manage your post-operative pain.    *Medications: You were given one or more narcotic pain medications before leaving the hospital. Have the prescriptions filled at a pharmacy on your way home and follow the instructions on the bottles.    *Narcotic Medication (usually Norco - hydrocodone or Percocet - oxycodone): Take this medication if needed to relieve severe pain. Take 1 pill every 4 hours. If your pain is not relieved you make take a second pill at your next dose. Always take with food.     *Take note: if you find you are taking more than 1 pill at EACH dose, contact the office as        the amount of acetaminophen may exceed appropriate levels.    *Nausea / Vomiting: For this issue, contact our office for a separate prescription that may be called in to your pharmacy.    *Cold Therapy: You may  have been sent home with a cold therapy unit and wrap for your shoulder. Fill with ice and water to the indicated fill line and use throughout the day for the first 3-5 days and then as needed to help relieve pain and control swelling. If you have not received one of these units, a bag of Ice will work as well. Use it as often as 20 minutes ONCE every hour. You can continue this for several weeks following surgery if needed.    *Regional Anesthesia Injections (Blocks): You may have been given a regional nerve block either before or after surgery. This may make your entire arm numb for 24-36 hours.          * Proceed with caution when trying to use your arm     3.  Constipation: During your hospital stay, you will be given a bowel regulating medication known as Miralax (Polyethylene Glycol 3350 or PEG 3350), this is given once daily and should be taken daily as long as you are taking pain medicine. It is an odorless, colorless powder and dissolved without any aftertaste. This helps regulate bowel function and is important to counteract the constipation effect of the pain medicine you will be given after surgery. If you continue to experience constipation after you are discharged, follow this recommendation:  1. Miralax - 1 cap full mixed with any liquid once daily  2. If no bowel movement OR very painful/difficult bowel movement within 2-3 days, begin Miralax - 1 cap full mixed with any liquid twice daily, then once a normal bowel movement occurs, decrease back to once daily  3. If no bowel movement with the twice daily regimen, take Miralax every 8 hours until a bowel movement occurs, then decrease back to once daily    4. Blood Clot Prevention: Blood clots are potential complications following any surgery. A blood clot from your leg can travel to your lungs and cause serious health complications. Preventing a blood clot from forming is critical and we do this by doing taking the following actions:   Exercising and  staying active (moving about)   Wearing support stockings  The symptoms of a blood clot include:   Pain and / or redness in your calf and leg unrelated to your incision.   Increased swelling of your thigh, calf, ankle, or foot.   Increased skin temperature at the site of the incision.   Shortness of breath and chest pain or pain when breathing.    5.   Return visit: Please schedule your return visit to Dr. García's office approximately 10-14 days after your procedure.    6.   Activity: Limit your activity during the first 48 hours, keep your arm elevated with pillows. After the first 48 hours at home, increase your activity level based on your symptoms.    7.   Dressing Change: Arthroscopy portals (wounds) are small and are usually closed with either steri-strips or sutures. It is normal for some blood / drainage to be seen on the dressings. It is also normal for you to see apparent bruising on the skin around your shoulder when you remove the dressing. If present, leave the steri-strip tape across the incisions. If you are concerned by the drainage or the appearance of your shoulder, please call one of the numbers listed above. You can remove the dressing (not the steri-strips) on the 2nd day after surgery. For larger incisions, you will need to keep it away from water until the stitches or staples are removed. You can use an ace bandage for support and compression if desired.     8.   Showering: You may shower on the 2nd day after surgery if the wound is dry and clean, but do not let the wound soak in water until sutures are removed. Do not submerge in any water until after your 2 week postoperative appointment in clinic.    9.   Shoulder Sling (with/without Pillow attachment): You may have been sent home with a sling / pillow attachment holding your arm away from your body. You may remove the sling when changing clothes or bathing. Make sure to wear the sling while sleeping unless instructed otherwise. You  may remove at rest or for exercises.       [x] You need to wear the sling for 24 hours a day for  4 weeks.    10.  Shoulder Exercises: Begin these exercises the first day after surgery in order to help you regain your motion and strength. You may do ONLY THE FOLLOWING MARKED exercises 3 times daily:       [x] Shoulder shrugs - Shrug your shoulders up and down.       [] Pendulums - Bend forward allowing your arm to hang down in front of you. Gently swing your arm side-to-side and front to back.                                                                                                                                   [x] Elbow motion - Straighten and bend your elbow.                                                                                                                   [x] Ball squeezes - use ball attached to sling/pillow or soft (nerf) ball for  strengthening                                                                                                                     [] Scapular retractions - (Squeeze shoulder blades together): Squeeze shoulder blades together while slightly pulling them down (do not shrug your shoulders upward); You can perform 10-15 reps, several times throughout the day, when seated at your desk, driving in the car, etc.                                                                                                                      11.  Physical Therapy: Rehabilitation is an essential component to your recovery from surgery. You will need formal physical therapy. If you require formal physical therapy, you are encouraged to find a Physical Therapy center that is both near your residence and comfortable to you. Please notify us if you have a preference of a Physical Therapy clinic. Please contact the office at the numbers above if you have any questions or if there is any delay in the start of Physical Therapy.

## 2020-02-19 NOTE — ANESTHESIA POSTPROCEDURE EVALUATION
Anesthesia Post Evaluation    Patient: Nan Avitia    Procedure(s) Performed: Procedure(s) (LRB):  ARTHROSCOPY, SHOULDER, WITH SLAP REPAIR (Right)  ARTHROSCOPY, SHOULDER, WITH SUBACROMIAL SPACE DECOMPRESSION (Right)    Final Anesthesia Type: general    Patient location during evaluation: PACU  Patient participation: Yes- Able to Participate  Level of consciousness: awake and alert  Post-procedure vital signs: reviewed and stable  Pain management: adequate (required 3.2 mg dilaudid in pacu)  Airway patency: patent    PONV status at discharge: No PONV  Anesthetic complications: no      Cardiovascular status: blood pressure returned to baseline and stable  Respiratory status: unassisted, spontaneous ventilation and room air  Hydration status: euvolemic  Follow-up not needed.          Vitals Value Taken Time   /62 2/19/2020 11:05 AM   Temp 36.3 °C (97.4 °F) 2/19/2020 10:17 AM   Pulse 60 2/19/2020 11:05 AM   Resp 16 2/19/2020 10:17 AM   SpO2 100 % 2/19/2020 11:05 AM   Vitals shown include unvalidated device data.      No case tracking events are documented in the log.      Pain/Macie Score: Pain Rating Prior to Med Admin: 7 (intermittent pain) (2/19/2020 11:00 AM)  Macie Score: 9 (2/19/2020 10:47 AM)

## 2020-02-19 NOTE — INTERVAL H&P NOTE
The patient has been examined and the H&P has been reviewed:    I concur with the findings and no changes have occurred since H&P was written.    Anesthesia/Surgery risks, benefits and alternative options discussed and understood by patient/family.          Active Hospital Problems    Diagnosis  POA    SLAP lesion of right shoulder [S43.152A]  Yes      Resolved Hospital Problems   No resolved problems to display.

## 2020-02-19 NOTE — BRIEF OP NOTE
[facility]  Brief Operative Note  Orthopaedic Surgery     SUMMARY     Surgery Date: 2/19/2020     Surgeon(s) and Role:     * Andrea García Jr., MD - Primary    Assisting Surgeon: None    Assistants: Patricio Sawant PA-C    Pre-op Diagnosis:  Superior glenoid labrum lesion of right shoulder, subsequent encounter [S43.431D]    Post-op Diagnosis:  Post-Op Diagnosis Codes:     * Superior glenoid labrum lesion of right shoulder, subsequent encounter [S43.431D]    Procedure(s) (LRB):  ARTHROSCOPY, SHOULDER, WITH SLAP REPAIR (Right)  ARTHROSCOPY, SHOULDER, WITH SUBACROMIAL SPACE DECOMPRESSION (Right)    Anesthesia: General    Description of the findings of the procedure: See dictated operative report for details    Estimated Blood Loss: less than 10         Specimens:   Specimen (12h ago, onward)    None          Discharge Note    SUMMARY     Admit Date: 2/19/2020    Discharge Date and Time:  02/19/2020 11:16 AM    Hospital Course (synopsis of major diagnoses, care, treatment, and services provided during the course of the hospital stay): Patient underwent outpatient right Shoulder surgery and was transferred to PACU in stable condition. In PACU, patient received appropriate post-operative care and discharged home with plans for physical therapy and follow-up with the operative surgeon.    Pre-op Diagnosis:  Superior glenoid labrum lesion of right shoulder, subsequent encounter [S43.431D]    Final Diagnosis:  Post-Op Diagnosis Codes:     * Superior glenoid labrum lesion of right shoulder, subsequent encounter [S43.431D]    Procedure(s) (LRB):  ARTHROSCOPY, SHOULDER, WITH SLAP REPAIR (Right)  ARTHROSCOPY, SHOULDER, WITH SUBACROMIAL SPACE DECOMPRESSION (Right)     Diet: Regular     Disposition: Home or Self Care    Follow Up/Patient Instructions:     Medications:  Reconciled Home Medications:      Medication List      START taking these medications    oxyCODONE-acetaminophen 5-325 mg per  tablet  Commonly known as:  PERCOCET  Take 1 tablet by mouth every 4 to 6 hours as needed for Pain.        CONTINUE taking these medications    aspirin 81 MG EC tablet  Commonly known as:  ECOTRIN  Take 81 mg by mouth once daily.     baclofen 20 MG tablet  Commonly known as:  LIORESAL  Take 20 mg by mouth 2 (two) times daily.     dihydroergotamine 0.5 mg/pump act. (4 mg/mL) nasal spray  Commonly known as:  MIGRANAL  1 spray by Nasal route as needed for Migraine. Use in one nostril as directed.  No more than 4 sprays in one hour     erenumab-aooe 140 mg/mL Atin  Inject 140 mg into the skin every 28 days.     lamoTRIgine 100 MG tablet  Commonly known as:  LAMICTAL  Take 300 mg by mouth once daily.     meloxicam 7.5 MG tablet  Commonly known as:  MOBIC  Take 15 mg by mouth once daily.     psyllium 0.52 gram capsule  Take 0.52 g by mouth once daily.     TYLENOL EXTRA STRENGTH ORAL  Take 1,000 mg by mouth daily as needed.     UNABLE TO FIND  Take 1 tablet by mouth 2 (two) times daily as needed. medication name:  Migravent          Discharge Procedure Orders   Diet general     Ice to affected area     Lifting restrictions   Order Comments: Operative extremity     No driving, operating heavy equipment or signing legal documents while taking pain medication     Call MD for:  temperature >100.4     Call MD for:  persistent nausea and vomiting     Call MD for:  severe uncontrolled pain     Call MD for:  difficulty breathing, headache or visual disturbances     Call MD for:  redness, tenderness, or signs of infection (pain, swelling, redness, odor or green/yellow discharge around incision site)     Call MD for:  hives     Call MD for:  persistent dizziness or light-headedness     Call MD for:  extreme fatigue     Remove dressing in 72 hours   Order Comments: Patient will remove dressing on post-op day 3, replace with OTC waterproof bandaids, can shower day 3     Follow-up Information     Andrea García Jr, MD In 2 weeks.     Specialty:  Orthopedic Surgery  Why:  For suture removal, For wound re-check  Contact information:  [address]  [facility] [address] [phone]

## 2020-02-19 NOTE — PLAN OF CARE
Nan Avitia has met all discharge criteria from Phase II. Vital Signs are stable, ambulating  without difficulty. Discharge instructions given, patient verbalized understanding. Pt dressed and awaiting transportation for discharge.

## 2020-02-19 NOTE — H&P
[facility]  History & Physical  Orthopedic Surgery    Subjective:     Chief Complaint/Reason for Admission: Right shoulder pain.    History of Present Illness:  Nan Avitia is a 39 y.o. y/o female presenting with a history of pain in the right shoulder. Risks and benefits of surgery were discussed and the patient wishes to proceed with surgery at this time.      Patient Active Problem List    Diagnosis Date Noted    Preop examination 12/11/2018       No current facility-administered medications for this encounter.     Current Outpatient Medications:     aspirin (ECOTRIN) 81 MG EC tablet, Take 81 mg by mouth once daily., Disp: , Rfl:     baclofen (LIORESAL) 20 MG tablet, Take 20 mg by mouth 2 (two) times daily., Disp: , Rfl:     dihydroergotamine (MIGRANAL) 0.5 mg/pump act. (4 mg/mL) nasal spray, 1 spray by Nasal route as needed for Migraine. Use in one nostril as directed.  No more than 4 sprays in one hour, Disp: , Rfl:     erenumab-aooe 140 mg/mL AtIn, Inject 140 mg into the skin every 28 days., Disp: , Rfl:     lamoTRIgine (LAMICTAL) 100 MG tablet, Take 300 mg by mouth once daily., Disp: , Rfl:     meloxicam (MOBIC) 7.5 MG tablet, Take 15 mg by mouth once daily. , Disp: , Rfl:     psyllium 0.52 gram capsule, Take 0.52 g by mouth once daily., Disp: , Rfl:     UNABLE TO FIND, Take 1 tablet by mouth 2 (two) times daily as needed. medication name:  Migravent, Disp: , Rfl:     Review of patient's allergies indicates:   Allergen Reactions    Compazine [prochlorperazine] Other (See Comments)     Causes tardive dyskensia       Past Medical History:   Diagnosis Date    Back pain     Migraine     PFO (patent foramen ovale)         Past Surgical History:   Procedure Laterality Date    BACK SURGERY      breast augmentation      BREAST SURGERY      COSMETIC SURGERY      CYSTOSCOPY N/A 12/11/2018    Procedure: CYSTOSCOPY;  Surgeon: Reba Ortez MD;  Location:  "[facility] OR;  Service: OB/GYN;  Laterality: N/A;    FOOT SURGERY Right     HYSTERECTOMY      INSERTION OF IMPLANTABLE LOOP RECORDER      knee scope      LAPAROSCOPY-ASSISTED VAGINAL HYSTERECTOMY Bilateral 12/11/2018    Procedure: HYSTERECTOMY, VAGINAL, LAPAROSCOPY-ASSISTED;  Surgeon: Reba Ortez MD;  [facility];  Service: OB/GYN;  Laterality: Bilateral;  video    LUMBAR FUSION  02/2016    SHOULDER ARTHROSCOPY  2003    uterine ablation  2016        No family history on file.     Social History     Tobacco Use    Smoking status: Never Smoker    Smokeless tobacco: Never Used   Substance Use Topics    Alcohol use: No     Frequency: Never     Comment: rare        Review of Systems:  Constitutional: negative for fever, weight loss  Cardiovascular: negative for chest pain, edema  Respiratory: negative for shortness of breath, cough  HEENT: negative for headaches, vision problems  Skin: negative for bruising, skin infections, rashes  GI: negative for nausea, vomiting  : negative for dysuria, hematuria  Psych: negative for anxiety, depression  Musculoskeletal: positive for joint pain  Endocrine: negative for cold intolerance, excessive thirst  Hematologic: negative for prolonged bleeding, use of blood thinners    OBJECTIVE:     General Appearance:    Alert, cooperative, no distress, appears stated age   Vital Signs:            Head:      Vitals:    02/06/20 1218   Weight: 66.7 kg (147 lb)   Height: 5' 10" (1.778 m)        Normocephalic, without obvious abnormality, atraumatic   Eyes:    PERRL, conjunctiva/corneas clear, both eyes        Nose:   Nares normal, no drainage or sinus tenderness   Throat:   Lips, mucosa, and tongue normal; teeth and gums normal   Neck:   Supple, normal ROM   Back:     Symmetric, no curvature, ROM normal   Lungs:     CTA bilaterally, respirations unlabored   Chest wall:    No tenderness or deformity   Heart:    Regular rate and rhythm, S1 and S2 normal, no murmur, rub   or " gallop   Abdomen:     Soft, non-tender   Extremities:   See clinic note   Pulses:   2+ and symmetric all extremities   Skin:   Skin color, texture, turgor normal, no rashes or lesions           Imaging Review:  Imaging reviewed    ASSESSMENT/PLAN:     Plan/Surgical Procedure: Right shoulder arthroscopy with possible SLAP repair versus biceps tenodesis    Surgery Date / Location: 2/19/20 by Dr. García at [facility]    Pre-op clearance per Anesthesia    DVT Prophylaxis: No blood thinners will be required post-operatively    Pt will d/c NSAIDs, Aspirin-containing products 7 days prior to procedure.    Informed written consent has been signed, patient wishes to proceed at this time.      Patricio Sawant PA-C  Orthopedics

## (undated) DEVICE — TROCAR ENDOPATH XCEL 11MM 10CM

## (undated) DEVICE — OCCLUDER COLPO-PNEUMO STERILE

## (undated) DEVICE — PAD SHOULDER CARE POLAR

## (undated) DEVICE — GLOVE BIOGEL SKINSENSE PI 8.0

## (undated) DEVICE — PASSER SUTURE LASSO STRGHT 90

## (undated) DEVICE — KIT ANTIFOG

## (undated) DEVICE — SUT LABRALTAPE 1.5X36 BL/WH

## (undated) DEVICE — SUPPORT SLING SHOT II MEDIUM

## (undated) DEVICE — SEE MEDLINE ITEM 157131

## (undated) DEVICE — DRAPE LAVH LAPAROSCOPY W/FLUID

## (undated) DEVICE — Device

## (undated) DEVICE — KIT TRIMANO

## (undated) DEVICE — TAPE SURG MEDIPORE 6X72IN

## (undated) DEVICE — UNDERGLOVES BIOGEL PI SIZE 8.5

## (undated) DEVICE — GLOVE PROTEXIS PI CLASSIC 7.0

## (undated) DEVICE — SEE MEDLINE ITEM 157116

## (undated) DEVICE — CLOSURE SKIN STERI STRIP 1/2X4

## (undated) DEVICE — TROCAR ENDOPATH XCEL 5X75MM

## (undated) DEVICE — KIT TRIMANO CHIN

## (undated) DEVICE — BLADE GREAT WHITE 4.2 6/BX

## (undated) DEVICE — CANNULA TWIST IN 7MM X 7CM

## (undated) DEVICE — GAUZE SPONGE 4X4 12PLY

## (undated) DEVICE — TIP RUMI WHITE DISP UMW676

## (undated) DEVICE — SEE MEDLINE ITEM 156955

## (undated) DEVICE — SEE MEDLINE ITEM 157160

## (undated) DEVICE — ADHESIVE DERMABOND ADVANCED

## (undated) DEVICE — DRAPE INCISE IOBAN 2 23X17IN

## (undated) DEVICE — CONTAINER PATHOLOGY W/LID 32OZ

## (undated) DEVICE — SEALER LIGASURE LAP 37CM 5MM

## (undated) DEVICE — APPLICATOR CHLORAPREP ORN 26ML

## (undated) DEVICE — SET CYSTO IRRIGATION UNIV SPIK

## (undated) DEVICE — GLOVE 6.5 PROTEXIS PI BLUE

## (undated) DEVICE — KIT FIXATION PERC ARTHSCP 2.9

## (undated) DEVICE — GLOVE PROTEXIS HYDROGEL SZ7.5

## (undated) DEVICE — SOL IRR NACL .9% 3000ML

## (undated) DEVICE — TROCAR ENDOPATH XCEL 5MM 7.5CM

## (undated) DEVICE — SUT CTD VICRYL 0 UND BR CT

## (undated) DEVICE — SUT CTD VICRYL 1 UND BR CT

## (undated) DEVICE — TUBE SET INFLOW/OUTFLOW

## (undated) DEVICE — SOL 9P NACL IRR PIC IL

## (undated) DEVICE — JELLY LUBRICANT STERILE 4 OZ

## (undated) DEVICE — TUBING INSUFFLATION 10

## (undated) DEVICE — PROBE ARTHO ENERGY 90 DEG

## (undated) DEVICE — SEE MEDLINE ITEM 157117

## (undated) DEVICE — GOWN B1 X-LG X-LONG

## (undated) DEVICE — PAD ABD 8X10 STERILE

## (undated) DEVICE — DRESSING XEROFORM FOIL PK 1X8

## (undated) DEVICE — PAD PREP 50/CA

## (undated) DEVICE — TRAY FOLEY 16FR INFECTION CONT

## (undated) DEVICE — SUT MONOCRYL 3-0 PS-2 UND

## (undated) DEVICE — BLADE SURG CARBON STEEL SZ11

## (undated) DEVICE — SUT CTD VICRYL CT-1 27

## (undated) DEVICE — IRRIGATOR ENDOSCOPY DISP.

## (undated) DEVICE — ADHESIVE MASTISOL VIAL 48/BX

## (undated) DEVICE — ELECTRODE REM PLYHSV RETURN 9

## (undated) DEVICE — SUT FIBERWIRE FIBER 2M

## (undated) DEVICE — GLOVE 8 PROTEXIS PI BLUE

## (undated) DEVICE — TIP RUMI BLUE DISPOSABLE 5/BX

## (undated) DEVICE — PACK BASIC

## (undated) DEVICE — MANIFOLD 4 PORT

## (undated) DEVICE — ALCOHOL 70% ISOP W/GREEN 16OZ

## (undated) DEVICE — GLOVE PROTEXIS HYDROGEL SZ6.5

## (undated) DEVICE — BANDAGE ADHESIVE

## (undated) DEVICE — COVER OVERHEAD SURG LT BLUE

## (undated) DEVICE — SUPPORT ULNA NERVE PROTECTOR

## (undated) DEVICE — DRAPE STERI INSTRUMENT 1018

## (undated) DEVICE — SYR 10CC LUER LOCK

## (undated) DEVICE — SEE MEDLINE ITEM 154981

## (undated) DEVICE — SEE MEDLINE ITEM 152622

## (undated) DEVICE — SYR 50CC LL

## (undated) DEVICE — BLADE SHAVER 4.5 6/BX

## (undated) DEVICE — SEE MEDLINE ITEM 146355

## (undated) DEVICE — SUT VICRYL 1 CT-1 27 UNDIE